# Patient Record
Sex: FEMALE | Race: WHITE | NOT HISPANIC OR LATINO | Employment: FULL TIME | URBAN - METROPOLITAN AREA
[De-identification: names, ages, dates, MRNs, and addresses within clinical notes are randomized per-mention and may not be internally consistent; named-entity substitution may affect disease eponyms.]

---

## 2012-10-24 LAB — HUMAN PAPILLOMAVIRUS (HPV): NORMAL

## 2015-08-24 LAB — HUMAN PAPILLOMAVIRUS (HPV): NORMAL

## 2017-05-30 ENCOUNTER — TELEPHONE (OUTPATIENT)
Dept: OTOLARYNGOLOGY | Facility: CLINIC | Age: 37
End: 2017-05-30

## 2017-05-30 DIAGNOSIS — H91.20: Primary | ICD-10-CM

## 2017-05-30 NOTE — TELEPHONE ENCOUNTER
Spoke with pt that states she had a sudden hearing loss on Sunday and is requesting appt today. I explained to pt that we do not have a provider in clinic today, made an appt for tomorrow with Kiarra Coughlin. Also advised pt to call Dr. Cruz to see if she can be seen today, due to this being an urgent appt. Pt agreed and verbalized understanding. Pt will call back to cancel if needed.

## 2017-05-30 NOTE — TELEPHONE ENCOUNTER
----- Message from Charo Mitchell sent at 5/30/2017  1:10 PM CDT -----  Contact: pt   Pt would like to be called back regarding scheduling an urgent appt due to pt waking up with slight hearing ache in right ear.       Pt can be reached at [phone].

## 2017-05-31 ENCOUNTER — OFFICE VISIT (OUTPATIENT)
Dept: OTOLARYNGOLOGY | Facility: CLINIC | Age: 37
End: 2017-05-31
Payer: COMMERCIAL

## 2017-05-31 ENCOUNTER — CLINICAL SUPPORT (OUTPATIENT)
Dept: AUDIOLOGY | Facility: CLINIC | Age: 37
End: 2017-05-31
Payer: COMMERCIAL

## 2017-05-31 VITALS
HEART RATE: 74 BPM | HEIGHT: 64 IN | SYSTOLIC BLOOD PRESSURE: 116 MMHG | WEIGHT: 145 LBS | BODY MASS INDEX: 24.75 KG/M2 | DIASTOLIC BLOOD PRESSURE: 65 MMHG

## 2017-05-31 DIAGNOSIS — H61.23 BILATERAL IMPACTED CERUMEN: ICD-10-CM

## 2017-05-31 DIAGNOSIS — H61.21 HEARING LOSS DUE TO CERUMEN IMPACTION, RIGHT: Primary | ICD-10-CM

## 2017-05-31 PROCEDURE — 99203 OFFICE O/P NEW LOW 30 MIN: CPT | Mod: 25,S$GLB,, | Performed by: NURSE PRACTITIONER

## 2017-05-31 PROCEDURE — 99999 PR PBB SHADOW E&M-EST. PATIENT-LVL III: CPT | Mod: PBBFAC,,, | Performed by: NURSE PRACTITIONER

## 2017-05-31 PROCEDURE — 69210 REMOVE IMPACTED EAR WAX UNI: CPT | Mod: S$GLB,,, | Performed by: NURSE PRACTITIONER

## 2017-05-31 NOTE — PROGRESS NOTES
Subjective:       Patient ID: Marla Uribe is a 37 y.o. female.    Chief Complaint: Hearing Loss    HPI   Patient states she sleeps on her right side in bed and woke up Sunday morning with right-sided hearing loss. She used Qtip and OTC swimmer's ear gtts, but no improvement. She denies otalgia or otorrhea. She denies URI or sinusitis symptoms. She denies acute exacerbation in allergy symptoms or recent flying. She denies trauma.     Review of Systems   Constitutional: Negative.    HENT: Positive for hearing loss.    Eyes: Negative.    Respiratory: Negative.    Cardiovascular: Negative.    Gastrointestinal: Negative.    Musculoskeletal: Negative.    Skin: Negative.    Neurological: Negative.    Hematological: Negative.    Psychiatric/Behavioral: Negative.        Objective:      Physical Exam   Constitutional: She is oriented to person, place, and time. Vital signs are normal. She appears well-developed and well-nourished. She is cooperative. She does not appear ill. No distress.   HENT:   Head: Normocephalic and atraumatic.   Right Ear: Hearing, tympanic membrane, external ear and ear canal normal. Tympanic membrane is not erythematous. No middle ear effusion.   Left Ear: Hearing, tympanic membrane, external ear and ear canal normal. Tympanic membrane is not erythematous.  No middle ear effusion.   Nose: Nose normal. No mucosal edema or rhinorrhea. Right sinus exhibits no maxillary sinus tenderness and no frontal sinus tenderness. Left sinus exhibits no maxillary sinus tenderness and no frontal sinus tenderness.   Mouth/Throat: Uvula is midline, oropharynx is clear and moist and mucous membranes are normal. Mucous membranes are not pale, not dry and not cyanotic. No oral lesions. No oropharyngeal exudate, posterior oropharyngeal edema or posterior oropharyngeal erythema.     SEPARATE PROCEDURE IN OFFICE:   Procedure: Removal of impacted cerumen, bilateral   Pre Procedure Diagnosis: Cerumen Impaction   Post  Procedure Diagnosis: Cerumen Impaction   Verbal informed consent in regards to risk of trauma to ear canal, ear drum or hearing, discomfort during procedure and/or inability to remove cerumen impaction in one session or unforeseen events or complications.   No anesthesia.     Procedure in detail:   Ear canal visualized bilateral with appropriate size ear speculum utilizing Operating Head Binocular Otomicroscope   Utilizing the following: Ring curet, delicate alligator forceps, and/or suction cannula. The impacted cerumen of the ear canals was removed atraumatically. The TM and EAC were then inspected and found to be clear of wax. See description of TMs/EACs in PE above.   Complications: No   Condition: Improved/Good     Eyes: Conjunctivae, EOM and lids are normal. Pupils are equal, round, and reactive to light. Right eye exhibits no discharge. Left eye exhibits no discharge. No scleral icterus.   Neck: Trachea normal and normal range of motion. Neck supple. No tracheal deviation present. No thyroid mass and no thyromegaly present.   Cardiovascular: Normal rate.    Pulmonary/Chest: Effort normal. No stridor. No respiratory distress. She has no wheezes.   Musculoskeletal: Normal range of motion.   Lymphadenopathy:        Head (right side): No submental, no submandibular, no tonsillar, no preauricular and no posterior auricular adenopathy present.        Head (left side): No submental, no submandibular, no tonsillar, no preauricular and no posterior auricular adenopathy present.     She has no cervical adenopathy.        Right cervical: No superficial cervical and no posterior cervical adenopathy present.       Left cervical: No superficial cervical and no posterior cervical adenopathy present.   Neurological: She is alert and oriented to person, place, and time. She has normal strength. Coordination and gait normal.   Skin: Skin is warm, dry and intact. No lesion and no rash noted. She is not diaphoretic. No cyanosis.  No pallor.   Psychiatric: She has a normal mood and affect. Her speech is normal and behavior is normal. Judgment and thought content normal. Cognition and memory are normal.   Nursing note and vitals reviewed.      Assessment:     Cerumen impactions removed AU      Plan:         Patient reported immediate resolution in aural symptoms following cerumen impaction removal  Return to clinic as needed for further ENT concerns

## 2017-07-10 ENCOUNTER — OFFICE VISIT (OUTPATIENT)
Dept: FAMILY MEDICINE | Facility: CLINIC | Age: 37
End: 2017-07-10
Payer: COMMERCIAL

## 2017-07-10 VITALS
SYSTOLIC BLOOD PRESSURE: 103 MMHG | DIASTOLIC BLOOD PRESSURE: 61 MMHG | HEIGHT: 64 IN | TEMPERATURE: 99 F | HEART RATE: 55 BPM | WEIGHT: 144.38 LBS | BODY MASS INDEX: 24.65 KG/M2

## 2017-07-10 DIAGNOSIS — D48.19 DESMOID TUMOR OF ABDOMEN: Primary | ICD-10-CM

## 2017-07-10 PROCEDURE — 99213 OFFICE O/P EST LOW 20 MIN: CPT | Mod: S$GLB,,, | Performed by: FAMILY MEDICINE

## 2017-07-10 PROCEDURE — 99999 PR PBB SHADOW E&M-EST. PATIENT-LVL II: CPT | Mod: PBBFAC,,, | Performed by: FAMILY MEDICINE

## 2017-07-10 NOTE — PROGRESS NOTES
"Subjective:      Patient ID: Marla Uribe is a 37 y.o. female.    Chief Complaint: Consult    HPI she has a desmoid fibromatosis of the lower abdomen and she was seen at [facility] by Dr. Antony.  Surgery was given as an option for treatment but due to the size of the tumor and because of the location and her activity level, radiation was not an option, chemotherapy was a high risk and she was felt to be too young for tamoxifen.  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  She later went to a surgeon but the surgery was felt to probably cause a great deformity to her abdomen and she is a very active person.  Her morbidity was felt to be big.  She then was seen at [facility] by Dr. Ken who performs a MR guided high intensity focused ultrasound treatment.  It is felt that this would be much less deforming.   At this point, there has a delay in her therapy due to insurance problems with approval for this therapy which would definitely be more indicated due to the significantly less risk of morbidity in this active lady.      Review of Systems    Objective:   /61   Pulse (!) 55   Temp 98.9 °F (37.2 °C) (Oral)   Ht 5' 4" (1.626 m)   Wt 65.5 kg (144 lb 6.4 oz)   BMI 24.79 kg/m²     Physical Exam   Constitutional: She is oriented to person, place, and time. She appears well-developed and well-nourished.   Cardiovascular: Normal rate and regular rhythm.    Pulmonary/Chest: Effort normal and breath sounds normal.   Abdominal: Soft. Bowel sounds are normal. She exhibits no distension and no mass (it is in the LLQ and is about 13x13 cm in size. ). There is tenderness. There is no rebound and no guarding. No hernia.   Neurological: She is alert and oriented to person, place, and time.   Skin: Skin is warm and dry.       Assessment:     1. Desmoid tumor of abdomen        Plan:   Due to the fact that this is an extremely " active female who has a job in doing [employer] so surgery would greatly alter that lifestyle and income stream.  I feel that it is indicated for this patient to do a procedure that has less morbidity so I recommend that because of these factors, the MR Guided High Intensity Focused ultrasound be done at this time.   They are going to be doing an appeal.

## 2017-07-12 NOTE — LETTER
July 14, 2017                  [facility]  Family Medicine  [address] [address]  Phone: [phone]  Fax: [phone]   July 14, 2017     Patient: Marla Uribe   YOB: 1980   Date of Visit: 7/12/2017     To Whom It May Concern:    I am writing to you on behalf of Marla Uribe, a long time patient of mine at [facility]. 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 Due to the rarity of this type of tumor, I recommended she visit the Sarcoma Clinic at [facility] in [facility] and be evaluated by a multidisciplinary team of specialists for treatment.    To date she is an established patient of Dr. Zuniga, a medical oncologist with [facility]. It is Dr. Zuniga medical opinion that she is not a good candidate for radiation, chemo therapy or hormone treatments. Based on my own research, her age, the location of her tumor and size, I agree with Dr. Zuniga medical opinion.      Since he felt that these treatments were not appropriate, Dr. Antony recommended that she meet with a surgical oncologist and a reconstructive surgeon to advise her on surgical options. The surgical team reported that resection is possible but will be life altering. The extent of  resection required for this sized tumor, would cause physical disability and great morbidity.    Currently there are no FDA-approved treatments specifically for desmoid fibromatosis; however, there has been promising results with magnetic resonance-guided focused ultrasound in reducing tumor volume with little to no toxicities associated with this treatment. Some patients have even experienced a 100% reduction of their tumor. Upon learning about this ultrasound technology, Mrs. Uribe visited Dr. Ken at the Interventional Radiology Clinic  with [place] who concluded that she would be a good candidate for this  treatment.      To summarize, Mrs. Uribe is pursuing the least morbid treatment option for her desmoid fibromatosis. After reviewing the research and the patients options, I  agreement with Dr. Antony and Dr. Ken that she should be treated with magnetic resonance-guided focused ultrasound.    If you have any questions or concerns, please dont hesitate to call.    Sincerely,    Joseph Boucher MD

## 2017-07-12 NOTE — LETTER
July 12, 2017                  [facility]  Family Medicine  [address] [place] [address]  Phone: [phone]  Fax: [phone]   July 12, 2017     Patient: Marla Uribe   YOB: 1980   Date of Visit: 7/10/2017       To Whom it May Concern:     I am writing to you on behalf of Marla Uribe, a long-time patient of mine at [facility] she was diagnosed with a rare soft tissue sarcoma, a desmoid fibromatosis, in her abdominal wall in December 2016.  Due to the rarity of this type of tumor, I recommended she visit the sarcoma clinic at [facility] in [facility] to be evaluated by multidisciplinary team of specialists for treatment.    Today, she is an established patient of Dr. Antony's, a medical oncologist with [facility].  It is Dr. Antony's medical opinion that she is not a good candidate for radiation, chemotherapy, or hormone treatments.  Based on my own research, her age, the location of her tumor and size, I agree with Dr. Antony's medical opinion.    Since he felt that these treatments were not appropriate, Dr. Antony recommended that she meet with a surgical oncologist and a reconstructive surgeon to advise her on surgical options.  The surgical team reported that resection is possible but will be life altering.  The extent of her resection that will be required for this sized tumor, would cause her great morbidity.Marla, with a support of Dr. Antony, has decided that this is not a treatment that she can pursue.    There are no FDA approved treatments specifically for desmoid fibromatosis; however, there has been promising results with magnetic resonance-guided focused ultrasound in reducing tumor volume with little to no toxicities associated with this treatment.  Some patients have even experienced a 100% reduction of their tumor.  Upon learning about this ultrasound technology, this is cheli visitedGh  at the interventional  radiology clinic with [place] who concluded that she would be a good candidate for this treatment.    To summarize, Mrs. bower is pursuing the least morbid treatment option for her desmoid fibromatosis.  After reviewing these options, I  agreement with Dr. Antony and Dr. Ken that she should be considered for the focused ultrasound treatment.  Please feel free to call me with any questions or concerns.  Thank you for your time and consideration.      If you have any questions or concerns, please don't hesitate to call.    Sincerely,         Joseph Boucher MD

## 2017-07-14 NOTE — ADDENDUM NOTE
Encounter addended by: Joseph Boucher MD on: 7/14/2017  8:12 AM<BR>    Actions taken: Letter status changed

## 2017-08-09 NOTE — PROGRESS NOTES
Pt reported decreased hearing. Cerumen impaction present. Cerumen removal completed by Kiarra Coughlin with immediate resolution of hearing loss symptoms reported by pt. No audio testing needed today.

## 2018-02-28 ENCOUNTER — HOSPITAL ENCOUNTER (OUTPATIENT)
Dept: RADIOLOGY | Facility: HOSPITAL | Age: 38
Discharge: HOME | End: 2018-02-28
Attending: FAMILY MEDICINE
Payer: COMMERCIAL

## 2018-02-28 ENCOUNTER — OFFICE VISIT (OUTPATIENT)
Dept: FAMILY MEDICINE | Facility: CLINIC | Age: 38
End: 2018-02-28
Payer: COMMERCIAL

## 2018-02-28 VITALS
WEIGHT: 143 LBS | HEIGHT: 64 IN | HEART RATE: 64 BPM | BODY MASS INDEX: 24.41 KG/M2 | SYSTOLIC BLOOD PRESSURE: 116 MMHG | DIASTOLIC BLOOD PRESSURE: 73 MMHG

## 2018-02-28 DIAGNOSIS — S86.911A KNEE STRAIN, RIGHT, INITIAL ENCOUNTER: ICD-10-CM

## 2018-02-28 DIAGNOSIS — S86.911A KNEE STRAIN, RIGHT, INITIAL ENCOUNTER: Primary | ICD-10-CM

## 2018-02-28 PROCEDURE — 73562 X-RAY EXAM OF KNEE 3: CPT | Mod: 26,LT,, | Performed by: RADIOLOGY

## 2018-02-28 PROCEDURE — 73564 X-RAY EXAM KNEE 4 OR MORE: CPT | Mod: 26,RT,, | Performed by: RADIOLOGY

## 2018-02-28 PROCEDURE — 73562 X-RAY EXAM OF KNEE 3: CPT | Mod: TC,PO,LT

## 2018-02-28 PROCEDURE — 90715 TDAP VACCINE 7 YRS/> IM: CPT | Mod: S$GLB,,, | Performed by: FAMILY MEDICINE

## 2018-02-28 PROCEDURE — 99213 OFFICE O/P EST LOW 20 MIN: CPT | Mod: 25,S$GLB,, | Performed by: FAMILY MEDICINE

## 2018-02-28 PROCEDURE — 90471 IMMUNIZATION ADMIN: CPT | Mod: S$GLB,,, | Performed by: FAMILY MEDICINE

## 2018-02-28 PROCEDURE — 99999 PR PBB SHADOW E&M-EST. PATIENT-LVL III: CPT | Mod: PBBFAC,,, | Performed by: FAMILY MEDICINE

## 2018-02-28 RX ORDER — MELOXICAM 15 MG/1
15 TABLET ORAL DAILY
Qty: 30 TABLET | Refills: 0 | Status: SHIPPED | OUTPATIENT
Start: 2018-02-28 | End: 2018-03-27 | Stop reason: SDUPTHER

## 2018-02-28 NOTE — PROGRESS NOTES
Arthritis is noted on the knees with the right being greater than the left.  There appears to be a small effusion of the knee.  I would hold on running for right now til this is resolved and then slowly start back to maximal tolerated activity.

## 2018-02-28 NOTE — PROGRESS NOTES
"Subjective:      Patient ID: Marla Uribe is a 37 y.o. female.    Chief Complaint: Knee Pain (right)    HPI  She fractured her right medial tibial plateau in 2012.  She is a runner. She states that last week, she was running pushing a stroller against the wind and she felt the right knee pop and since then, she is popping, swelling and she has had increased pain.  The pain has improved some but she is concerned about it.   She states that if she sits for too long, it is locking before she can extend it.  It will pop once she straightens it.  Overnight last week, it would lock with sleeping but this has improved some. When she had her fracture, she had no surgery and was in a brace and she had PT after.  She has not had NSAID's for this.  She has not had any ice or wrapping of this.it feels like this is directly behind the knee cap towards the top.     Review of Systems   Constitutional: Negative for activity change and unexpected weight change.   HENT: Negative for hearing loss, rhinorrhea and trouble swallowing.    Eyes: Negative for discharge and visual disturbance.   Respiratory: Negative for chest tightness and wheezing.    Cardiovascular: Negative for chest pain and palpitations.   Gastrointestinal: Negative for blood in stool, constipation, diarrhea and vomiting.   Endocrine: Negative for polydipsia and polyuria.   Genitourinary: Negative for difficulty urinating, dysuria, hematuria and menstrual problem.   Musculoskeletal: Negative for arthralgias, joint swelling and neck pain.   Neurological: Negative for weakness and headaches.   Psychiatric/Behavioral: Negative for confusion and dysphoric mood.       Objective:   /73   Pulse 64   Ht 5' 4" (1.626 m)   Wt 64.9 kg (143 lb)   BMI 24.55 kg/m²     Physical Exam   Musculoskeletal:        Right knee: She exhibits normal range of motion, no swelling, no effusion, no ecchymosis, no laceration, no erythema, no LCL laxity, no bony tenderness, normal " meniscus and no MCL laxity. No tenderness found. No medial joint line, no lateral joint line, no MCL, no LCL and no patellar tendon tenderness noted.       Assessment:     1. Knee strain, right, initial encounter        Plan:   Marla was seen today for knee pain.    Diagnoses and all orders for this visit:    Knee strain, right, initial encounter  -     X-Ray Knee 3 View Right; Future    Other orders  -     meloxicam (MOBIC) 15 MG tablet; Take 1 tablet (15 mg total) by mouth once daily.  -     Tdap Vaccine      rtc in 2 weeks if not better.  Use stretches which I gave.

## 2018-03-27 RX ORDER — MELOXICAM 15 MG/1
TABLET ORAL
Qty: 30 TABLET | Refills: 2 | Status: SHIPPED | OUTPATIENT
Start: 2018-03-27 | End: 2018-10-22

## 2018-09-24 ENCOUNTER — PATIENT OUTREACH (OUTPATIENT)
Dept: ADMINISTRATIVE | Facility: HOSPITAL | Age: 38
End: 2018-09-24

## 2018-10-22 ENCOUNTER — OFFICE VISIT (OUTPATIENT)
Dept: FAMILY MEDICINE | Facility: CLINIC | Age: 38
End: 2018-10-22
Payer: COMMERCIAL

## 2018-10-22 ENCOUNTER — HOSPITAL ENCOUNTER (OUTPATIENT)
Dept: RADIOLOGY | Facility: HOSPITAL | Age: 38
Discharge: HOME | End: 2018-10-22
Attending: NURSE PRACTITIONER
Payer: COMMERCIAL

## 2018-10-22 VITALS
BODY MASS INDEX: 23.45 KG/M2 | WEIGHT: 137.38 LBS | SYSTOLIC BLOOD PRESSURE: 102 MMHG | HEIGHT: 64 IN | DIASTOLIC BLOOD PRESSURE: 62 MMHG | HEART RATE: 70 BPM | TEMPERATURE: 99 F

## 2018-10-22 DIAGNOSIS — S69.91XA INJURY OF RIGHT WRIST, INITIAL ENCOUNTER: Primary | ICD-10-CM

## 2018-10-22 DIAGNOSIS — S69.91XA INJURY OF RIGHT WRIST, INITIAL ENCOUNTER: ICD-10-CM

## 2018-10-22 PROCEDURE — 73110 X-RAY EXAM OF WRIST: CPT | Mod: TC,PO,RT

## 2018-10-22 PROCEDURE — 73110 X-RAY EXAM OF WRIST: CPT | Mod: 26,RT,, | Performed by: RADIOLOGY

## 2018-10-22 PROCEDURE — 3008F BODY MASS INDEX DOCD: CPT | Mod: CPTII,S$GLB,, | Performed by: NURSE PRACTITIONER

## 2018-10-22 PROCEDURE — 99213 OFFICE O/P EST LOW 20 MIN: CPT | Mod: S$GLB,,, | Performed by: NURSE PRACTITIONER

## 2018-10-22 PROCEDURE — 99999 PR PBB SHADOW E&M-EST. PATIENT-LVL III: CPT | Mod: PBBFAC,,, | Performed by: NURSE PRACTITIONER

## 2018-10-22 RX ORDER — DICLOFENAC SODIUM 75 MG/1
75 TABLET, DELAYED RELEASE ORAL 2 TIMES DAILY
Qty: 14 TABLET | Refills: 0 | Status: SHIPPED | OUTPATIENT
Start: 2018-10-22 | End: 2018-10-29

## 2018-10-22 NOTE — PROGRESS NOTES
Subjective:       Patient ID: Marla Uribe is a 38 y.o. female.    Chief Complaint: Wrist Pain    Wrist Injury    The incident occurred more than 1 week ago. Incident location: while hiking outdoors. Injury mechanism: slipped while trying to jump over a tree that had fallen. The pain is present in the right wrist. The quality of the pain is described as aching. The pain does not radiate. The pain is mild. The pain has been intermittent since the incident. Pertinent negatives include no chest pain, numbness or tingling. The symptoms are aggravated by movement. She has tried nothing for the symptoms.       Review of Systems   Constitutional: Negative for fatigue, fever and unexpected weight change.   HENT: Negative for ear pain and sore throat.    Eyes: Negative for pain and visual disturbance.   Respiratory: Negative for cough and shortness of breath.    Cardiovascular: Negative for chest pain and palpitations.   Gastrointestinal: Negative for abdominal pain, diarrhea and vomiting.   Musculoskeletal: Positive for arthralgias (right wrist). Negative for myalgias.   Skin: Negative for color change and rash.   Neurological: Negative for dizziness, tingling, numbness and headaches.   Psychiatric/Behavioral: Negative for dysphoric mood and sleep disturbance. The patient is not nervous/anxious.        Vitals:    10/22/18 0905   BP: 102/62   Pulse: 70   Temp: 98.8 °F (37.1 °C)       Objective:     Current Outpatient Medications   Medication Sig Dispense Refill    diclofenac (VOLTAREN) 75 MG EC tablet Take 1 tablet (75 mg total) by mouth 2 (two) times daily. for 7 days 14 tablet 0     No current facility-administered medications for this visit.        Physical Exam   Constitutional: She is oriented to person, place, and time. She appears well-developed and well-nourished. No distress.   HENT:   Head: Normocephalic and atraumatic.   Eyes: EOM are normal. Pupils are equal, round, and reactive to light.   Neck: Normal range  of motion. Neck supple.   Cardiovascular: Normal rate and regular rhythm.   Pulmonary/Chest: Effort normal and breath sounds normal.   Musculoskeletal: Normal range of motion.        Right wrist: She exhibits normal range of motion, no tenderness, no swelling, no effusion and no deformity.   Neurological: She is alert and oriented to person, place, and time.   Skin: Skin is warm and dry. No rash noted.   Psychiatric: She has a normal mood and affect. Judgment normal.   Nursing note and vitals reviewed.      Assessment:       1. Injury of right wrist, initial encounter        Plan:   Injury of right wrist, initial encounter  -     X-Ray Wrist Complete Right; Future; Expected date: 10/22/2018    Other orders  -     diclofenac (VOLTAREN) 75 MG EC tablet; Take 1 tablet (75 mg total) by mouth 2 (two) times daily. for 7 days  Dispense: 14 tablet; Refill: 0        No Follow-up on file.    There are no Patient Instructions on file for this visit.

## 2019-04-08 ENCOUNTER — OFFICE VISIT (OUTPATIENT)
Dept: FAMILY MEDICINE | Facility: CLINIC | Age: 39
End: 2019-04-08
Payer: COMMERCIAL

## 2019-04-08 VITALS
BODY MASS INDEX: 22.88 KG/M2 | WEIGHT: 134 LBS | HEIGHT: 64 IN | DIASTOLIC BLOOD PRESSURE: 68 MMHG | SYSTOLIC BLOOD PRESSURE: 121 MMHG | HEART RATE: 86 BPM | TEMPERATURE: 99 F

## 2019-04-08 DIAGNOSIS — M76.812 ANTERIOR TIBIALIS TENDONITIS OF LEFT LEG: Primary | ICD-10-CM

## 2019-04-08 PROCEDURE — 3008F PR BODY MASS INDEX (BMI) DOCUMENTED: ICD-10-PCS | Mod: CPTII,S$GLB,, | Performed by: FAMILY MEDICINE

## 2019-04-08 PROCEDURE — 3008F BODY MASS INDEX DOCD: CPT | Mod: CPTII,S$GLB,, | Performed by: FAMILY MEDICINE

## 2019-04-08 PROCEDURE — 99213 OFFICE O/P EST LOW 20 MIN: CPT | Mod: S$GLB,,, | Performed by: FAMILY MEDICINE

## 2019-04-08 PROCEDURE — 99999 PR PBB SHADOW E&M-EST. PATIENT-LVL III: CPT | Mod: PBBFAC,,, | Performed by: FAMILY MEDICINE

## 2019-04-08 PROCEDURE — 99999 PR PBB SHADOW E&M-EST. PATIENT-LVL III: ICD-10-PCS | Mod: PBBFAC,,, | Performed by: FAMILY MEDICINE

## 2019-04-08 PROCEDURE — 99213 PR OFFICE/OUTPT VISIT, EST, LEVL III, 20-29 MIN: ICD-10-PCS | Mod: S$GLB,,, | Performed by: FAMILY MEDICINE

## 2019-04-08 RX ORDER — CYCLOBENZAPRINE HCL 5 MG
5 TABLET ORAL 3 TIMES DAILY PRN
Qty: 20 TABLET | Refills: 0 | Status: SHIPPED | OUTPATIENT
Start: 2019-04-08 | End: 2024-03-01

## 2019-04-08 NOTE — PROGRESS NOTES
Subjective:      Patient ID: Marla Uribe is a 39 y.o. female.    Chief Complaint: Leg Pain (right leg )    Problem List Items Addressed This Visit     None      Visit Diagnoses     Anterior tibialis tendonitis of left leg    -  Primary      she ran in a 100 mile race this past weekend and she developed pain in the left leg that rendered her unable to walk.  She has been on crutches from this. She has not had numbness and she is not swollen in the leg.  She has been on nsaids and trying to stretch.     Past Medical History:  No past medical history on file.  Past Surgical History:   Procedure Laterality Date     SECTION       Review of patient's allergies indicates:  No Known Allergies  No current outpatient medications on file prior to visit.     No current facility-administered medications on file prior to visit.      Social History     Socioeconomic History    Marital status:      Spouse name: Not on file    Number of children: Not on file    Years of education: Not on file    Highest education level: Not on file   Occupational History    Not on file   Social Needs    Financial resource strain: Not on file    Food insecurity:     Worry: Not on file     Inability: Not on file    Transportation needs:     Medical: Not on file     Non-medical: Not on file   Tobacco Use    Smoking status: Never Smoker   Substance and Sexual Activity    Alcohol use: Not on file    Drug use: Not on file    Sexual activity: Not on file   Lifestyle    Physical activity:     Days per week: Not on file     Minutes per session: Not on file    Stress: Not on file   Relationships    Social connections:     Talks on phone: Not on file     Gets together: Not on file     Attends Restorationism service: Not on file     Active member of club or organization: Not on file     Attends meetings of clubs or organizations: Not on file     Relationship status: Not on file   Other Topics Concern    Not on file   Social  "History Narrative    Not on file     Family History   Problem Relation Age of Onset    Anesthesia problems Neg Hx     Clotting disorder Neg Hx        Review of Systems    Objective:     /68   Pulse 86   Temp 99 °F (37.2 °C) (Oral)   Ht 5' 4" (1.626 m)   Wt 60.8 kg (134 lb)   LMP 03/25/2019 (Approximate)   BMI 23.00 kg/m²     Physical Exam   Musculoskeletal:        Legs:    Assessment:     1. Anterior tibialis tendonitis of left leg        Plan:     Problem List Items Addressed This Visit     None      Visit Diagnoses     Anterior tibialis tendonitis of left leg    -  Primary      Gennipher was seen today for leg pain.    Diagnoses and all orders for this visit:    Anterior tibialis tendonitis of left leg    Other orders  -     cyclobenzaprine (FLEXERIL) 5 MG tablet; Take 1 tablet (5 mg total) by mouth 3 (three) times daily as needed for Muscle spasms.      Use otc ibuprofen 800 mg po tid.  Stretches were given.  No follow-ups on file.  "

## 2019-09-24 ENCOUNTER — PATIENT OUTREACH (OUTPATIENT)
Dept: ADMINISTRATIVE | Facility: HOSPITAL | Age: 39
End: 2019-09-24

## 2019-10-11 ENCOUNTER — PATIENT OUTREACH (OUTPATIENT)
Dept: ADMINISTRATIVE | Facility: HOSPITAL | Age: 39
End: 2019-10-11

## 2020-07-24 DIAGNOSIS — Z12.39 BREAST CANCER SCREENING: ICD-10-CM

## 2020-10-06 ENCOUNTER — PATIENT MESSAGE (OUTPATIENT)
Dept: ADMINISTRATIVE | Facility: HOSPITAL | Age: 40
End: 2020-10-06

## 2020-10-14 ENCOUNTER — PATIENT MESSAGE (OUTPATIENT)
Dept: FAMILY MEDICINE | Facility: CLINIC | Age: 40
End: 2020-10-14

## 2021-03-25 ENCOUNTER — PATIENT MESSAGE (OUTPATIENT)
Dept: ADMINISTRATIVE | Facility: HOSPITAL | Age: 41
End: 2021-03-25

## 2021-05-06 ENCOUNTER — PATIENT MESSAGE (OUTPATIENT)
Dept: RESEARCH | Facility: HOSPITAL | Age: 41
End: 2021-05-06

## 2021-12-27 ENCOUNTER — PATIENT MESSAGE (OUTPATIENT)
Dept: FAMILY MEDICINE | Facility: CLINIC | Age: 41
End: 2021-12-27
Payer: COMMERCIAL

## 2021-12-27 ENCOUNTER — TELEPHONE (OUTPATIENT)
Dept: FAMILY MEDICINE | Facility: CLINIC | Age: 41
End: 2021-12-27
Payer: COMMERCIAL

## 2021-12-27 DIAGNOSIS — U07.1 LAB TEST POSITIVE FOR DETECTION OF COVID-19 VIRUS: Primary | ICD-10-CM

## 2021-12-27 NOTE — TELEPHONE ENCOUNTER
The patient tested positive for COVID and she wants to get the infusion.  I will place orders.  I have signed for the following orders AND/OR meds.  Please call the patient and ask the patient to schedule the testing AND/OR inform about any medications that were sent.      Orders Placed This Encounter   Procedures    Ambulatory referral/consult to [facility]     Standing Status:   Future     Standing Expiration Date:   1/6/2022     Referral Priority:   Routine     Referral Type:   Consultation     Referral Reason:   Specialty Services Required     Number of Visits Requested:   1    COVID-19 Home Symptom Monitoring  - Duration (days): 14     Order Specific Question:   Duration (days)     Answer:   14

## 2022-07-08 ENCOUNTER — PATIENT MESSAGE (OUTPATIENT)
Dept: FAMILY MEDICINE | Facility: CLINIC | Age: 42
End: 2022-07-08
Payer: COMMERCIAL

## 2022-12-23 ENCOUNTER — PATIENT MESSAGE (OUTPATIENT)
Dept: FAMILY MEDICINE | Facility: CLINIC | Age: 42
End: 2022-12-23
Payer: COMMERCIAL

## 2023-03-28 DIAGNOSIS — M79.10 MYALGIA: Primary | ICD-10-CM

## 2023-03-28 NOTE — PROGRESS NOTES
She ran a 100 mile race this weekend and has severe myalgia of her legs. Because of this, I will check CBC and a CMP and a CPK. She has had a lot of fatigue also.

## 2023-03-29 ENCOUNTER — LAB VISIT (OUTPATIENT)
Dept: LAB | Facility: HOSPITAL | Age: 43
End: 2023-03-29
Attending: FAMILY MEDICINE
Payer: COMMERCIAL

## 2023-03-29 DIAGNOSIS — M79.10 MYALGIA: ICD-10-CM

## 2023-03-29 LAB
ALBUMIN SERPL BCP-MCNC: 4 G/DL (ref 3.5–5.2)
ALP SERPL-CCNC: 52 U/L (ref 55–135)
ALT SERPL W/O P-5'-P-CCNC: 23 U/L (ref 10–44)
ANION GAP SERPL CALC-SCNC: 5 MMOL/L (ref 8–16)
AST SERPL-CCNC: 29 U/L (ref 10–40)
BASOPHILS # BLD AUTO: 0.01 K/UL (ref 0–0.2)
BASOPHILS NFR BLD: 0.1 % (ref 0–1.9)
BILIRUB SERPL-MCNC: 0.7 MG/DL (ref 0.1–1)
BUN SERPL-MCNC: 13 MG/DL (ref 6–20)
CALCIUM SERPL-MCNC: 9.7 MG/DL (ref 8.7–10.5)
CHLORIDE SERPL-SCNC: 106 MMOL/L (ref 95–110)
CK SERPL-CCNC: 194 U/L (ref 20–180)
CO2 SERPL-SCNC: 27 MMOL/L (ref 23–29)
CREAT SERPL-MCNC: 0.9 MG/DL (ref 0.5–1.4)
DIFFERENTIAL METHOD: ABNORMAL
EOSINOPHIL # BLD AUTO: 0.2 K/UL (ref 0–0.5)
EOSINOPHIL NFR BLD: 3 % (ref 0–8)
ERYTHROCYTE [DISTWIDTH] IN BLOOD BY AUTOMATED COUNT: 12 % (ref 11.5–14.5)
EST. GFR  (NO RACE VARIABLE): >60 ML/MIN/1.73 M^2
GLUCOSE SERPL-MCNC: 104 MG/DL (ref 70–110)
HCT VFR BLD AUTO: 40.9 % (ref 37–48.5)
HGB BLD-MCNC: 13.5 G/DL (ref 12–16)
IMM GRANULOCYTES # BLD AUTO: 0.06 K/UL (ref 0–0.04)
IMM GRANULOCYTES NFR BLD AUTO: 0.9 % (ref 0–0.5)
LYMPHOCYTES # BLD AUTO: 2.2 K/UL (ref 1–4.8)
LYMPHOCYTES NFR BLD: 32.3 % (ref 18–48)
MCH RBC QN AUTO: 31.5 PG (ref 27–31)
MCHC RBC AUTO-ENTMCNC: 33 G/DL (ref 32–36)
MCV RBC AUTO: 96 FL (ref 82–98)
MONOCYTES # BLD AUTO: 0.6 K/UL (ref 0.3–1)
MONOCYTES NFR BLD: 9 % (ref 4–15)
NEUTROPHILS # BLD AUTO: 3.7 K/UL (ref 1.8–7.7)
NEUTROPHILS NFR BLD: 54.7 % (ref 38–73)
NRBC BLD-RTO: 0 /100 WBC
PLATELET # BLD AUTO: 181 K/UL (ref 150–450)
PMV BLD AUTO: 11.1 FL (ref 9.2–12.9)
POTASSIUM SERPL-SCNC: 4.5 MMOL/L (ref 3.5–5.1)
PROT SERPL-MCNC: 7.1 G/DL (ref 6–8.4)
RBC # BLD AUTO: 4.28 M/UL (ref 4–5.4)
SODIUM SERPL-SCNC: 138 MMOL/L (ref 136–145)
WBC # BLD AUTO: 6.75 K/UL (ref 3.9–12.7)

## 2023-03-29 PROCEDURE — 85025 COMPLETE CBC W/AUTO DIFF WBC: CPT | Performed by: FAMILY MEDICINE

## 2023-03-29 PROCEDURE — 80053 COMPREHEN METABOLIC PANEL: CPT | Performed by: FAMILY MEDICINE

## 2023-03-29 PROCEDURE — 82550 ASSAY OF CK (CPK): CPT | Performed by: FAMILY MEDICINE

## 2023-03-29 PROCEDURE — 36415 COLL VENOUS BLD VENIPUNCTURE: CPT | Mod: PO | Performed by: FAMILY MEDICINE

## 2023-03-30 ENCOUNTER — PATIENT MESSAGE (OUTPATIENT)
Dept: FAMILY MEDICINE | Facility: CLINIC | Age: 43
End: 2023-03-30
Payer: COMMERCIAL

## 2023-03-30 DIAGNOSIS — N92.6 MENSTRUAL IRREGULARITY: Primary | ICD-10-CM

## 2023-03-30 NOTE — PROGRESS NOTES
Gennipher,   The cpk is minimally increased.  This is NOTHING given what you recently did and does not indicate to me that you have rhabdo so I am fine with this.  People with rhabdo have have values in the 1000's.   The CMP is a comprehensive metabolic panel of all electrolytes including a look at the liver, kidney and to look at your sugar level.  All of the numbers appear acceptable and no changes are recommended.  The CBC is a comprehensive review of the blood count, white count and differential of white cells.  All of the findings are acceptable at this time and no significant changes are noted.    So, how are you feeling?    TH

## 2023-03-30 NOTE — TELEPHONE ENCOUNTER
I have signed for the following orders AND/OR meds.  Please call the patient and ask the patient to schedule the testing AND/OR inform about any medications that were sent.      Orders Placed This Encounter   Procedures    FOLLICLE STIMULATING HORMONE     Standing Status:   Future     Standing Expiration Date:   5/28/2024    LUTEINIZING HORMONE     Standing Status:   Future     Standing Expiration Date:   5/28/2024    TESTOSTERONE     Standing Status:   Future     Standing Expiration Date:   5/28/2024    ESTROGENS, TOTAL     Standing Status:   Future     Standing Expiration Date:   5/28/2024            [unfilled]

## 2023-03-31 ENCOUNTER — LAB VISIT (OUTPATIENT)
Dept: LAB | Facility: HOSPITAL | Age: 43
End: 2023-03-31
Attending: FAMILY MEDICINE
Payer: COMMERCIAL

## 2023-03-31 DIAGNOSIS — N92.6 MENSTRUAL IRREGULARITY: ICD-10-CM

## 2023-03-31 LAB
FSH SERPL-ACNC: 12.85 MIU/ML
LH SERPL-ACNC: 5.3 MIU/ML
TESTOST SERPL-MCNC: 27 NG/DL (ref 5–73)

## 2023-03-31 PROCEDURE — 82672 ASSAY OF ESTROGEN: CPT | Performed by: FAMILY MEDICINE

## 2023-03-31 PROCEDURE — 83002 ASSAY OF GONADOTROPIN (LH): CPT | Performed by: FAMILY MEDICINE

## 2023-03-31 PROCEDURE — 83001 ASSAY OF GONADOTROPIN (FSH): CPT | Performed by: FAMILY MEDICINE

## 2023-03-31 PROCEDURE — 84403 ASSAY OF TOTAL TESTOSTERONE: CPT | Performed by: FAMILY MEDICINE

## 2023-03-31 PROCEDURE — 36415 COLL VENOUS BLD VENIPUNCTURE: CPT | Mod: PO | Performed by: FAMILY MEDICINE

## 2023-04-06 LAB — ESTROGEN SERPL-MCNC: 196 PG/ML

## 2023-05-05 ENCOUNTER — PATIENT MESSAGE (OUTPATIENT)
Dept: FAMILY MEDICINE | Facility: CLINIC | Age: 43
End: 2023-05-05
Payer: COMMERCIAL

## 2023-05-08 ENCOUNTER — PATIENT MESSAGE (OUTPATIENT)
Dept: FAMILY MEDICINE | Facility: CLINIC | Age: 43
End: 2023-05-08
Payer: COMMERCIAL

## 2024-03-01 ENCOUNTER — OFFICE VISIT (OUTPATIENT)
Dept: PRIMARY CARE CLINIC | Facility: CLINIC | Age: 44
End: 2024-03-01
Payer: COMMERCIAL

## 2024-03-01 ENCOUNTER — HOSPITAL ENCOUNTER (OUTPATIENT)
Dept: RADIOLOGY | Facility: HOSPITAL | Age: 44
Discharge: HOME | End: 2024-03-01
Attending: NURSE PRACTITIONER
Payer: COMMERCIAL

## 2024-03-01 VITALS
HEART RATE: 49 BPM | BODY MASS INDEX: 25.63 KG/M2 | DIASTOLIC BLOOD PRESSURE: 70 MMHG | OXYGEN SATURATION: 99 % | WEIGHT: 150.13 LBS | SYSTOLIC BLOOD PRESSURE: 120 MMHG | HEIGHT: 64 IN

## 2024-03-01 DIAGNOSIS — M53.3 TAIL BONE PAIN: Primary | ICD-10-CM

## 2024-03-01 DIAGNOSIS — M53.3 TAIL BONE PAIN: ICD-10-CM

## 2024-03-01 PROCEDURE — 99203 OFFICE O/P NEW LOW 30 MIN: CPT | Mod: S$GLB,,, | Performed by: NURSE PRACTITIONER

## 2024-03-01 PROCEDURE — 72220 X-RAY EXAM SACRUM TAILBONE: CPT | Mod: 26,,, | Performed by: RADIOLOGY

## 2024-03-01 PROCEDURE — 1159F MED LIST DOCD IN RCRD: CPT | Mod: CPTII,S$GLB,, | Performed by: NURSE PRACTITIONER

## 2024-03-01 PROCEDURE — 99999 PR PBB SHADOW E&M-EST. PATIENT-LVL III: CPT | Mod: PBBFAC,,, | Performed by: NURSE PRACTITIONER

## 2024-03-01 PROCEDURE — 3078F DIAST BP <80 MM HG: CPT | Mod: CPTII,S$GLB,, | Performed by: NURSE PRACTITIONER

## 2024-03-01 PROCEDURE — 3008F BODY MASS INDEX DOCD: CPT | Mod: CPTII,S$GLB,, | Performed by: NURSE PRACTITIONER

## 2024-03-01 PROCEDURE — 3074F SYST BP LT 130 MM HG: CPT | Mod: CPTII,S$GLB,, | Performed by: NURSE PRACTITIONER

## 2024-03-01 PROCEDURE — 72220 X-RAY EXAM SACRUM TAILBONE: CPT | Mod: TC,PO

## 2024-04-16 ENCOUNTER — PATIENT MESSAGE (OUTPATIENT)
Dept: PRIMARY CARE CLINIC | Facility: CLINIC | Age: 44
End: 2024-04-16
Payer: COMMERCIAL

## 2024-04-16 DIAGNOSIS — M53.3 TAIL BONE PAIN: Primary | ICD-10-CM

## 2024-04-18 NOTE — TELEPHONE ENCOUNTER
I have signed for the following orders AND/OR meds.  Please call the patient and ask the patient to schedule the testing AND/OR inform about any medications that were sent. Medications have been sent to pharmacy listed below      Orders Placed This Encounter   Procedures    Ambulatory referral/consult to Physical/Occupational Therapy     Standing Status:   Future     Standing Expiration Date:   5/17/2025     Referral Priority:   Routine     Referral Type:   Physical Medicine     Referral Reason:   Specialty Services Required     Requested Specialty:   Physical Therapy     Number of Visits Requested:   1              [id number] [place] - [address] [address]  [address]  [place] [address]  Phone: [phone] Fax: [phone]